# Patient Record
Sex: MALE | Race: WHITE
[De-identification: names, ages, dates, MRNs, and addresses within clinical notes are randomized per-mention and may not be internally consistent; named-entity substitution may affect disease eponyms.]

---

## 2020-03-11 ENCOUNTER — HOSPITAL ENCOUNTER (EMERGENCY)
Dept: HOSPITAL 5 - ED | Age: 45
Discharge: HOME | End: 2020-03-11
Payer: SELF-PAY

## 2020-03-11 VITALS — SYSTOLIC BLOOD PRESSURE: 143 MMHG | DIASTOLIC BLOOD PRESSURE: 89 MMHG

## 2020-03-11 DIAGNOSIS — R07.9: ICD-10-CM

## 2020-03-11 DIAGNOSIS — R50.9: ICD-10-CM

## 2020-03-11 DIAGNOSIS — B34.9: Primary | ICD-10-CM

## 2020-03-11 DIAGNOSIS — Z79.899: ICD-10-CM

## 2020-03-11 PROCEDURE — 71046 X-RAY EXAM CHEST 2 VIEWS: CPT

## 2020-03-11 NOTE — EMERGENCY DEPARTMENT REPORT
Blank Doc





- Documentation


Documentation: 





44-year-old male that presents with fever, tachycardia and cough.





This initial assessment/diagnostic orders/clinical plan/treatment(s) is/are 

subject to change based on patient's health status, clinical progression and re-

assessment by fellow clinical providers in the ED.  Further treatment and workup

at subsequent clinical providers discretion.  Patient/guardians urged not to 

elope from the ED as their condition may be serious if not clinically assessed 

and managed.  Initial orders include:


1- Patient sent to [facility] for further evaluation and treatment


2- CXR

## 2020-03-11 NOTE — XRAY REPORT
CHEST 2 VIEWS



INDICATION / CLINICAL INFORMATION:

Cough, chest congestion and fever.



COMPARISON: 

None available.



FINDINGS:



SUPPORT DEVICES: None.

HEART / MEDIASTINUM: The heart size and pulmonary vasculature are normal. 

LUNGS / PLEURA: No significant pulmonary or pleural abnormality. No pneumothorax. 

ADDITIONAL FINDINGS: No significant additional findings.



IMPRESSION: No acute findings. There is no evidence of pneumonia.



Signer Name: Jonathan Hoffmann MD 

Signed: 3/11/2020 8:58 PM

Workstation Name: [id number]

## 2020-03-11 NOTE — EMERGENCY DEPARTMENT REPORT
- General


Chief Complaint: Upper Respiratory Infection


Stated Complaint: CHEST PAIN/FLU SX


Time Seen by Provider: 20 19:46


Source: patient


Mode of arrival: Ambulatory


Limitations: No Limitations





- History of Present Illness


Initial Comments: 





44-year-old  male presents emergency department complaining of a couple 

day history of cough congestion with fever sensation with associated coryza 

having a strong suspicion of being his his allergist department.  He is a non-

smoker reports no hemoptysis no hematemesis no hematochezia.  No chest pain or 

palpitations no nausea vomiting no abdominal pain no constipation or diarrhea.


MD Complaint: cough, rhinorrhea





- Related Data


                                  Previous Rx's











 Medication  Instructions  Recorded  Last Taken  Type


 


Albuterol INH(or & Nicu Only) 1 puff IH Q4-6H PRN #1 inha 20 Unknown Rx





[ProAir HFA Inhaler]    


 


guaiFENesin/CODEINE [Robitussin AC] 5 ml PO Q6H PRN #120 ml 20 Unknown Rx


 


predniSONE [Deltasone] 50 mg PO QDAY #5 tab 20 Unknown Rx











                                    Allergies











Allergy/AdvReac Type Severity Reaction Status Date / Time


 


No Known Allergies Allergy   Unverified 20 18:14














ED Review of Systems


ROS: 


Stated complaint: CHEST PAIN/FLU SX


Other details as noted in HPI





Comment: All other systems reviewed and negative





ED Past Medical Hx





- Past Medical History


Previous Medical History?: No





- Surgical History


Past Surgical History?: No





- Social History


Smoking Status: Never Smoker


Substance Use Type: None





- Medications


Home Medications: 


                                Home Medications











 Medication  Instructions  Recorded  Confirmed  Last Taken  Type


 


Albuterol INH(or & Nicu Only) 1 puff IH Q4-6H PRN #1 inha 20  Unknown Rx





[ProAir HFA Inhaler]     


 


guaiFENesin/CODEINE [Robitussin AC] 5 ml PO Q6H PRN #120 ml 20  Unknown Rx


 


predniSONE [Deltasone] 50 mg PO QDAY #5 tab 20  Unknown Rx














ED Physical Exam





- General


Limitations: No Limitations


General appearance: alert, in no apparent distress





- Head


Head exam: Present: atraumatic, normocephalic





- Eye


Eye exam: Present: normal appearance





- ENT


ENT exam: Present: mucous membranes moist





- Neck


Neck exam: Present: normal inspection





- Respiratory


Respiratory exam: Present: normal lung sounds bilaterally.  Absent: respiratory 

distress





- Cardiovascular


Cardiovascular Exam: Present: regular rate, normal rhythm.  Absent: systolic 

murmur, diastolic murmur, rubs, gallop





- GI/Abdominal


GI/Abdominal exam: Present: soft, normal bowel sounds





- Rectal


Rectal exam: Present: deferred





- Extremities Exam


Extremities exam: Present: normal inspection





- Back Exam


Back exam: Present: normal inspection





- Neurological Exam


Neurological exam: Present: alert, oriented X3





- Psychiatric


Psychiatric exam: Present: normal affect, normal mood





- Skin


Skin exam: Present: warm, dry, intact, normal color.  Absent: rash





ED Course


                                   Vital Signs











  20





  19:46


 


Temperature 99.7 F H


 


Pulse Rate 108 H


 


Respiratory 20





Rate 


 


Blood Pressure 143/89


 


O2 Sat by Pulse 97





Oximetry 














ED Medical Decision Making





- Radiology Data


Radiology results: report reviewed





Referring Physician:TRISH OJEDAPatient Name:LAN ROJASPatient 

ID:[id number] of Birth:[id number]:MaleAccession:[id number] 

Date:0901-18-01Zbcqvs Status:Finalized


Findings





[facility] 


[address] [place] [address] 





XRay Report 


Signed 





 Patient: LAN CULP MR# 


 : [id number] 


 : 1975 Acct:[id number] 





 Age/Sex: 44 / M ADM Date: 20 





 Loc: ED 


 Attending Dr: 








 Ordering Physician: TRISH OJEDA NP 


 Date of Service: 20 


 Procedure(s): XR chest routine 2V 


 Accession Number(s): [id number] 





 cc: TRISH OJEDA NP 





 Fluoro Time In Minutes: 





 CHEST 2 VIEWS 





INDICATION / CLINICAL INFORMATION: 


Cough, chest congestion and fever. 





COMPARISON: 


None available. 





FINDINGS: 





SUPPORT DEVICES: None. 


HEART / MEDIASTINUM: The heart size and pulmonary vasculature are normal. 


LUNGS / PLEURA: No significant pulmonary or pleural abnormality. No 

pneumothorax. 


ADDITIONAL FINDINGS: No significant additional findings. 





IMPRESSION: No acute findings. There is no evidence of pneumonia. 





Signer Name: Jonathan Hoffmann MD 


Signed: 3/11/2020 8:58 PM 


Workstation Name: [id number] 








 Transcribed By: RT 


 Dictated By: Jonathan Hoffmann MD 


 Electronically Authenticated By: Jonathan Hoffmann MD 


 Signed Date/Time: 20 











 DD/DT: 20 


 TD/TT: 





- Medical Decision Making





This 44-year-old male patient presents with symptoms suspicious for likely viral

 upper respiratory tract infection.  Differential includes bacterial pneumonia, 

sinusitis, allergic rhinitis, influenza.  Do not suspect underlying Ca

rdiopulmonary process.  I considered but think unlikely dangerous cause of this 

patient symptoms to include acute coronary syndrome, CHF or COPD exacerbations, 

pneumonia, pneumothorax.  Patient is nontoxic appearing and not in need of 

emergent medical intervention.





Plan: Reassurance, reassessment, over-the-counter medications, discharge with 

PCP follow-up


Critical care attestation.: 


If time is entered above; I have spent that time in minutes in the direct care 

of this critically ill patient, excluding procedure time.








ED Disposition


Clinical Impression: 


 Viral syndrome





Disposition: DC-01 TO HOME OR SELFCARE


Is pt being admited?: No


Does the pt Need Aspirin: No


Condition: Stable


Instructions:  Cold Symptoms (ED), Acute Cough (ED), Viral Syndrome (ED)


Referrals: 


PRIMARY CARE,MD [Primary Care Provider] - 3-5 Days (Please keep your appointment

with your primary care provider as we discussed)